# Patient Record
Sex: FEMALE | Race: WHITE | NOT HISPANIC OR LATINO | Employment: FULL TIME | URBAN - METROPOLITAN AREA
[De-identification: names, ages, dates, MRNs, and addresses within clinical notes are randomized per-mention and may not be internally consistent; named-entity substitution may affect disease eponyms.]

---

## 2020-08-04 ENCOUNTER — VIRTUAL VISIT (OUTPATIENT)
Dept: FAMILY MEDICINE | Facility: OTHER | Age: 30
End: 2020-08-04
Payer: COMMERCIAL

## 2020-08-05 ENCOUNTER — AMBULATORY - HEALTHEAST (OUTPATIENT)
Dept: FAMILY MEDICINE | Facility: CLINIC | Age: 30
End: 2020-08-05

## 2020-08-05 DIAGNOSIS — Z20.822 SUSPECTED COVID-19 VIRUS INFECTION: ICD-10-CM

## 2020-08-06 NOTE — PROGRESS NOTES
"Date: 2020 13:39:24  Clinician: Saw Huber  Clinician NPI: [id number]  Patient: Anika Olvera  Patient : 1990  Patient Address: [address] [place] [id number]  Patient Phone: [phone]  Visit Protocol: URI  Patient Summary:  Anika is a 29 year old ( : 1990 ) female who initiated a Visit for COVID-19 (Coronavirus) evaluation and screening. When asked the question \"Please sign me up to receive news, health information and promotions from [facility].\", Anika responded \"No\".    Anika states her symptoms started gradually 10-13 days ago. After her symptoms started, they improved and then got worse again.   Her symptoms consist of nausea, myalgia, and malaise.   Anika denies having wheezing, teeth pain, ageusia, diarrhea, sore throat, anosmia, facial pain or pressure, fever, cough, nasal congestion, vomiting, rhinitis, ear pain, headache, chills, and enlarged lymph nodes. She also denies having recent facial or sinus surgery in the past 60 days and taking antibiotic medication in the past month.   Precipitating events  She has not recently been exposed to someone with influenza. Anika has not been in close contact with any high risk individuals.   Pertinent COVID-19 (Coronavirus) information  In the past 14 days, Anika has not worked in a congregate living setting.   She does not work or volunteer as healthcare worker or a  and does not work or volunteer in a healthcare facility.   Anika also has not lived in a congregate living setting in the past 14 days. She does not live with a healthcare worker.   Anika has not had a close contact with a laboratory-confirmed COVID-19 patient within 14 days of symptom onset.   Triage Point(s) temporarily suspended for COVID-19 (Coronavirus) screening  Anika reported the following symptoms which were previously protocol referral points. These protocol referral points have temporarily been removed for purposes of COVID-19 " (Coronavirus) screening.   Difficulty breathing even when resting and can only speak in phrase(s)   Pertinent medical history  [place] does not get yeast infections when she takes antibiotics.   Anika does not need a return to work/school note.   Weight: 116 lbs   Anika does not smoke or use smokeless tobacco.   She denies pregnancy and denies breastfeeding. She has menstruated in the past month.   Additional information as reported by the patient (free text): Fatigue, muscle soreness, dizziness/feeling lightheaded have accompanied the shortness of breath   Weight: 116 lbs    MEDICATIONS: No current medications, ALLERGIES: NKDA  Clinician Response:  Dear Anika,   Your symptoms show that you may have coronavirus (COVID-19). This illness can cause fever, cough and trouble breathing. Many people get a mild case and get better on their own. Some people can get very sick.  What should I do?  We would like to test you for this virus.   1. Please call [phone] to schedule your visit. Explain that you were referred by [facility] to have a COVID-19 test. Be ready to share your [facility] visit ID number.  The following will serve as your written order for this COVID Test, ordered by me, for the indication of suspected COVID [Z20.828]: The test will be ordered in [employer], our electronic health record, after you are scheduled. It will show as ordered and authorized by Jin Olvera MD.  Order: COVID-19 (Coronavirus) PCR for SYMPTOMATIC testing from [facility].      2. When it's time for your COVID test:  Stay at least 6 feet away from others. (If someone will drive you to your test, stay in the backseat, as far away from the  as you can.)   Cover your mouth and nose with a mask, tissue or washcloth.  Go straight to the testing site. Don't make any stops on the way there or back.      3.Starting now: Stay home and away from others (self-isolate) until:   You've had no fever---and no medicine that reduces fever---for 3 full days (52  "hours). And...   Your other symptoms have gotten better. For example, your cough or breathing has improved. And...   At least 10 days have passed since your symptoms started.       During this time, don't leave the house except for testing or medical care.   Stay in your own room, even for meals. Use your own bathroom if you can.   Stay away from others in your home. No hugging, kissing or shaking hands. No visitors.  Don't go to work, school or anywhere else.    Clean \"high touch\" surfaces often (doorknobs, counters, handles, etc.). Use a household cleaning spray or wipes. You'll find a full list of  on the EPA website: www.epa.gov/pesticide-registration/list-n-disinfectants-use-against-sars-cov-2.   Cover your mouth and nose with a mask, tissue or washcloth to avoid spreading germs.  Wash your hands and face often. Use soap and water.  Caregivers in these groups are at risk for severe illness due to COVID-19:  o People 65 years and older  o People who live in a nursing home or long-term care facility  o People with chronic disease (lung, heart, cancer, diabetes, kidney, liver, immunologic)  o People who have a weakened immune system, including those who:   Are in cancer treatment  Take medicine that weakens the immune system, such as corticosteroids  Had a bone marrow or organ transplant  Have an immune deficiency  Have poorly controlled HIV or AIDS  Are obese (body mass index of 40 or higher)  Smoke regularly   o Caregivers should wear gloves while washing dishes, handling laundry and cleaning bedrooms and bathrooms.  o Use caution when washing and drying laundry: Don't shake dirty laundry, and use the warmest water setting that you can.  o For more tips, go to www.cdc.gov/coronavirus/2019-ncov/downloads/10Things.pdf.    4.Sign up for [facility]. We know it's scary to hear that you might have COVID-19. We want to track your symptoms to make sure you're okay over the next 2 weeks. Please look for an " email from Judith Carty---this is a free, online program that we'll use to keep in touch. To sign up, follow the link in the email. Learn more at [email or web address]  How can I take care of myself?   Get lots of rest. Drink extra fluids (unless a doctor has told you not to).   Take Tylenol (acetaminophen) for fever or pain. If you have liver or kidney problems, ask your family doctor if it's okay to take Tylenol.   Adults can take either:    650 mg (two 325 mg pills) every 4 to 6 hours, or...   1,000 mg (two 500 mg pills) every 8 hours as needed.    Note: Don't take more than 3,000 mg in one day. Acetaminophen is found in many medicines (both prescribed and over-the-counter medicines). Read all labels to be sure you don't take too much.   For children, check the Tylenol bottle for the right dose. The dose is based on the child's age or weight.    If you have other health problems (like cancer, heart failure, an organ transplant or severe kidney disease): Call your specialty clinic if you don't feel better in the next 2 days.       Know when to call 911. Emergency warning signs include:    Trouble breathing or shortness of breath Pain or pressure in the chest that doesn't go away Feeling confused like you haven't felt before, or not being able to wake up Bluish-colored lips or face.  Where can I get more information?   [facility] -- About COVID-19: [email or web address]   CDC -- What to Do If You're Sick: www.cdc.gov/coronavirus/2019-ncov/about/steps-when-sick.html   CDC -- Ending Home Isolation: www.cdc.gov/coronavirus/2019-ncov/hcp/disposition-in-home-patients.html   CDC -- Caring for Someone: www.cdc.gov/coronavirus/2019-ncov/if-you-are-sick/care-for-someone.html   [facility] -- Interim Guidance for Hospital Discharge to Home: www.health.[facility].[place].us/diseases/coronavirus/hcp/hospdischarge.pdf   [facility] clinical trials (COVID-19 research studies):  clinicalaffairs.[facility]-clinical-trials    Below are the COVID-19 hotlines at the [place] Department of Health ([facility]). Interpreters are available.    For health questions: Call 406-131-8381 or 1-355.207.5204 (7 a.m. to 7 p.m.) For questions about schools and childcare: Call 331-483-4062 or [phone] (7 a.m. to 7 p.m.)    Diagnosis: Other malaise  Diagnosis ICD: R53.81

## 2020-08-07 ENCOUNTER — COMMUNICATION - HEALTHEAST (OUTPATIENT)
Dept: SCHEDULING | Facility: CLINIC | Age: 30
End: 2020-08-07

## 2020-08-09 ENCOUNTER — NURSE TRIAGE (OUTPATIENT)
Dept: NURSING | Facility: CLINIC | Age: 30
End: 2020-08-09

## 2020-08-09 NOTE — TELEPHONE ENCOUNTER
"Chest tightness and SOB for one week she did have covid test on Wed and she got results on Thursday.      Reason for Disposition    MODERATE difficulty breathing (e.g., speaks in phrases, SOB even at rest, pulse 100-120)    Additional Information    Negative: SEVERE difficulty breathing (e.g., struggling for each breath, speaks in single words)    Negative: Difficult to awaken or acting confused (e.g., disoriented, slurred speech)    Negative: Bluish (or gray) lips or face now    Negative: Shock suspected (e.g., cold/pale/clammy skin, too weak to stand, low BP, rapid pulse)    Negative: Sounds like a life-threatening emergency to the triager    Negative: [1] COVID-19 exposure AND [2] no symptoms    Negative: COVID-19 and Breastfeeding, questions about    Negative: [1] Adult with possible COVID-19 symptoms AND [2] triager concerned about severity of symptoms or other causes    Negative: SEVERE or constant chest pain or pressure (Exception: mild central chest pain, present only when coughing)    Answer Assessment - Initial Assessment Questions  1. COVID-19 DIAGNOSIS: \"Who made your Coronavirus (COVID-19) diagnosis?\" \"Was it confirmed by a positive lab test?\" If not diagnosed by a HCP, ask \"Are there lots of cases (community spread) where you live?\" (See public health department website, if unsure)      She has not been exposed to anyone she knows of  2. ONSET: \"When did the COVID-19 symptoms start?\"       7 days  3. WORST SYMPTOM: \"What is your worst symptom?\" (e.g., cough, fever, shortness of breath, muscle aches)      Chest tightness and SOB  4. COUGH: \"Do you have a cough?\" If so, ask: \"How bad is the cough?\"        no  5. FEVER: \"Do you have a fever?\" If so, ask: \"What is your temperature, how was it measured, and when did it start?\"      no  6. RESPIRATORY STATUS: \"Describe your breathing?\" (e.g., shortness of breath, wheezing, unable to speak)       She can lay flat but is worse with exertion and she has pain on " "her sternum and in her upper back  7. BETTER-SAME-WORSE: \"Are you getting better, staying the same or getting worse compared to yesterday?\"  If getting worse, ask, \"In what way?\"      Getting worse  8. HIGH RISK DISEASE: \"Do you have any chronic medical problems?\" (e.g., asthma, heart or lung disease, weak immune system, etc.)     no  9. PREGNANCY: \"Is there any chance you are pregnant?\" \"When was your last menstrual period?\"      no  10. OTHER SYMPTOMS: \"Do you have any other symptoms?\"  (e.g., chills, fatigue, headache, loss of smell or taste, muscle pain, sore throat)        no    Protocols used: CORONAVIRUS (COVID-19) DIAGNOSED OR [id number] 5.16.20      "

## 2021-06-20 ENCOUNTER — HEALTH MAINTENANCE LETTER (OUTPATIENT)
Age: 31
End: 2021-06-20

## 2021-09-20 ENCOUNTER — NURSE TRIAGE (OUTPATIENT)
Dept: NURSING | Facility: CLINIC | Age: 31
End: 2021-09-20

## 2021-09-20 ENCOUNTER — OFFICE VISIT (OUTPATIENT)
Dept: URGENT CARE | Facility: URGENT CARE | Age: 31
End: 2021-09-20
Payer: COMMERCIAL

## 2021-09-20 VITALS
HEART RATE: 75 BPM | OXYGEN SATURATION: 100 % | DIASTOLIC BLOOD PRESSURE: 70 MMHG | RESPIRATION RATE: 16 BRPM | SYSTOLIC BLOOD PRESSURE: 105 MMHG | TEMPERATURE: 98.2 F

## 2021-09-20 DIAGNOSIS — B02.9 HERPES ZOSTER WITHOUT COMPLICATION: Primary | ICD-10-CM

## 2021-09-20 PROCEDURE — 99203 OFFICE O/P NEW LOW 30 MIN: CPT | Performed by: FAMILY MEDICINE

## 2021-09-20 RX ORDER — VALACYCLOVIR HYDROCHLORIDE 1 G/1
1000 TABLET, FILM COATED ORAL 3 TIMES DAILY
Qty: 21 TABLET | Refills: 0 | Status: SHIPPED | OUTPATIENT
Start: 2021-09-20 | End: 2021-12-02

## 2021-09-20 NOTE — PROGRESS NOTES
SUBJECTIVE: Anika Olvera is a 31 year old female presenting with a chief complaint of left leg rash and tingling.  Onset of symptoms was day(s) ago.    No past medical history on file.  No Known Allergies  Social History     Tobacco Use     Smoking status: Never Smoker     Smokeless tobacco: Never Used   Substance Use Topics     Alcohol use: Not on file       ROS:  SKIN: no rash  GI: no vomiting    OBJECTIVE:  /70   Pulse 75   Temp 98.2  F (36.8  C) (Tympanic)   Resp 16   SpO2 100%   Breastfeeding No GENERAL APPEARANCE: healthy, alert and no distress  SKIN: red vesicular rash left lower ext      ICD-10-CM    1. Herpes zoster without complication  B02.9 valACYclovir (VALTREX) 1000 mg tablet       Fluids/Rest, f/u if worse/not any better

## 2021-09-20 NOTE — TELEPHONE ENCOUNTER
Triage call. Patient calling.     She has a rash that started a couple of days ago.  Had itching in left upper thigh which progressed to pain. Now has 2 small blisters on her upper thigh.  Pain is better today.  Has had chicken pox, concerned that it could be shingles.Also noticed that the pain was traveling down her left leg.     Per protocol, see today in office. Advised to be seen at urgent care if no appointments available.  Patient agrees to plan. Transferred to scheduling.     Velia Alcala RN 09/20/21 2:43 PM  [facility] Nurse Advisor    Reason for Disposition    Painful rash with multiple small blisters grouped together (i.e., dermatomal distribution or 'band' or 'stripe')    Additional Information    Negative: Sounds like a life-threatening emergency to the triager    Negative: Fever and localized purple or blood-colored spots or dots that are not from injury or friction    Negative: Fever and localized rash is very painful    Negative: Patient sounds very sick or weak to the triager    Negative: Looks like a boil, infected sore, deep ulcer, or other infected rash (spreading redness, pus)    Protocols used: RASH OR REDNESS - [id number]    COVID 19 Nurse Triage Plan/Patient Instructions    Please be aware that novel coronavirus (COVID-19) may be circulating in the community. If you develop symptoms such as fever, cough, or SOB or if you have concerns about the presence of another infection including coronavirus (COVID-19), please contact your health care provider or visit [email or web address].     Disposition/Instructions    In-Person Visit with provider recommended. Reference Visit Selection Guide.    Thank you for taking steps to prevent the spread of this virus.  o Limit your contact with others.  o Wear a simple mask to cover your cough.  o Wash your hands well and often.    Resources    [facility]: About COVID-19: [email or web address]    CDC: What to Do If You're  Sick: www.cdc.gov/coronavirus/2019-ncov/about/steps-when-sick.html    CDC: Ending Home Isolation: www.cdc.gov/coronavirus/2019-ncov/hcp/disposition-in-home-patients.html     CDC: Caring for Someone: www.cdc.gov/coronavirus/2019-ncov/if-you-are-sick/care-for-someone.html     [facility]: Interim Guidance for Hospital Discharge to Home: www.[facility].mn./diseases/coronavirus/hcp/hospdischarge.pdf    [facility] clinical trials (COVID-19 research studies): clinicalaffairs.[facility]-clinical-trials     Below are the COVID-19 hotlines at the [place] Department of Health ([facility]). Interpreters are available.   o For health questions: Call 232-253-4806 or 1-423.495.8283 (7 a.m. to 7 p.m.)  o For questions about schools and childcare: Call 857-851-7499 or 1-620.808.8879 (7 a.m. to 7 p.m.)

## 2021-10-11 ENCOUNTER — HEALTH MAINTENANCE LETTER (OUTPATIENT)
Age: 31
End: 2021-10-11

## 2021-11-01 ASSESSMENT — PATIENT HEALTH QUESTIONNAIRE - PHQ9
SUM OF ALL RESPONSES TO PHQ QUESTIONS 1-9: 12
SUM OF ALL RESPONSES TO PHQ QUESTIONS 1-9: 12
10. IF YOU CHECKED OFF ANY PROBLEMS, HOW DIFFICULT HAVE THESE PROBLEMS MADE IT FOR YOU TO DO YOUR WORK, TAKE CARE OF THINGS AT HOME, OR GET ALONG WITH OTHER PEOPLE: VERY DIFFICULT

## 2021-11-02 ASSESSMENT — PATIENT HEALTH QUESTIONNAIRE - PHQ9: SUM OF ALL RESPONSES TO PHQ QUESTIONS 1-9: 12

## 2021-11-04 ENCOUNTER — OFFICE VISIT (OUTPATIENT)
Dept: OBGYN | Facility: CLINIC | Age: 31
End: 2021-11-04
Payer: COMMERCIAL

## 2021-11-04 VITALS
OXYGEN SATURATION: 99 % | SYSTOLIC BLOOD PRESSURE: 137 MMHG | TEMPERATURE: 98.5 F | DIASTOLIC BLOOD PRESSURE: 80 MMHG | HEART RATE: 95 BPM

## 2021-11-04 DIAGNOSIS — Z12.4 ENCOUNTER FOR PAPANICOLAOU SMEAR FOR CERVICAL CANCER SCREENING: Primary | ICD-10-CM

## 2021-11-04 DIAGNOSIS — Z30.431 IUD CHECK UP: ICD-10-CM

## 2021-11-04 DIAGNOSIS — F41.9 ANXIETY: ICD-10-CM

## 2021-11-04 PROCEDURE — G0124 SCREEN C/V THIN LAYER BY MD: HCPCS | Performed by: PATHOLOGY

## 2021-11-04 PROCEDURE — G0145 SCR C/V CYTO,THINLAYER,RESCR: HCPCS | Performed by: OBSTETRICS & GYNECOLOGY

## 2021-11-04 PROCEDURE — 99203 OFFICE O/P NEW LOW 30 MIN: CPT | Performed by: OBSTETRICS & GYNECOLOGY

## 2021-11-04 PROCEDURE — 87624 HPV HI-RISK TYP POOLED RSLT: CPT | Performed by: OBSTETRICS & GYNECOLOGY

## 2021-11-04 NOTE — PATIENT INSTRUCTIONS
Prior to IUD removal and replacement, make sure you are well hydrated, eat a meal and take ibuprofen 600-800mg (3-4 tabs) approx 30 min before appointment.

## 2021-11-04 NOTE — PROGRESS NOTES
GYN CLINIC VISIT  11/4/2021  CC: IUD discussion, pap    HPI:  Anika Olvera is a 31 year old with mirena IUD for contraception and heavy menses.   Interested in replacement but not today.      PHQ 11/1/2021   PHQ-9 Total Score 12   Q9: Thoughts of better off dead/self-harm past 2 weeks Not at all       Answers for HPI/ROS submitted by the patient on 11/1/2021  If you checked off any problems, how difficult have these problems made it for you to do your work, take care of things at home, or get along with other people?: Very difficult  PHQ9 TOTAL SCORE: 12      Vitals:    11/04/21 1036   BP: 137/80   Pulse: 95   Temp: 98.5  F (36.9  C)   SpO2: 99%       Gen: alert, oriented, no distress,  pleasant, appears stated age, well groomed  Abd: soft,, nontender, nondistended  : normal external genitalia without lesions or erythema; normal, well supported urethra, normal Bartholins, normal Skenes; normal pink rugated vaginal mucosa, no lesions or abnormal discharge; medium sherrie speculum inserted without difficulty; normal appearing cervix without lesions, bleeding or discharge. Bimanual exam shows 6week sized uterus, mobile, no fundal tenderness, no CMT, no adnexal masses or tenderness.  Extr: warm, well perfused, nontender, no edema  Psych: affect bright, cooperative, responds appropriately    ASSESSMENT  31 year old here for pap and to discuss IUD replacement    1. Encounter for Papanicolaou smear for cervical cancer screening  Follow up per ASCCP guidelines.  - Pap imaged thin layer screen with HPV - recommended age 30 - 65 years (select HPV order below)    2. IUD check up  Scheduled for IUD replacement in Dec.  Recommend she eat and take ibuprofen 600-800 prior to visit.    3. Anxiety  - MENTAL HEALTH REFERRAL  - Adult; Outpatient Treatment; Individual/Couples/Family/Group Therapy/Health Psychology; [facility] [phone]; We will contact you to schedule the appointment or please call with any  questions; Future      Vero Mejia MD

## 2021-11-11 LAB
BKR LAB AP GYN ADEQUACY: ABNORMAL
BKR LAB AP GYN INTERPRETATION: ABNORMAL
BKR LAB AP HPV REFLEX: ABNORMAL
BKR LAB AP PREVIOUS ABNORMAL: ABNORMAL
PATH REPORT.COMMENTS IMP SPEC: ABNORMAL
PATH REPORT.COMMENTS IMP SPEC: ABNORMAL
PATH REPORT.RELEVANT HX SPEC: ABNORMAL

## 2021-11-12 LAB
HUMAN PAPILLOMA VIRUS 16 DNA: NEGATIVE
HUMAN PAPILLOMA VIRUS 18 DNA: NEGATIVE
HUMAN PAPILLOMA VIRUS FINAL DIAGNOSIS: NORMAL
HUMAN PAPILLOMA VIRUS OTHER HR: NEGATIVE

## 2021-11-15 PROBLEM — R87.610 ASCUS OF CERVIX WITH NEGATIVE HIGH RISK HPV: Status: ACTIVE | Noted: 2021-11-15

## 2021-11-26 ENCOUNTER — TELEPHONE (OUTPATIENT)
Dept: BEHAVIORAL HEALTH | Facility: CLINIC | Age: 31
End: 2021-11-26
Payer: COMMERCIAL

## 2021-11-30 ENCOUNTER — VIRTUAL VISIT (OUTPATIENT)
Dept: PSYCHOLOGY | Facility: CLINIC | Age: 31
End: 2021-11-30
Payer: COMMERCIAL

## 2021-11-30 DIAGNOSIS — F41.1 GENERALIZED ANXIETY DISORDER: Primary | ICD-10-CM

## 2021-11-30 PROCEDURE — 90834 PSYTX W PT 45 MINUTES: CPT | Mod: GT | Performed by: STUDENT IN AN ORGANIZED HEALTH CARE EDUCATION/TRAINING PROGRAM

## 2021-11-30 ASSESSMENT — ANXIETY QUESTIONNAIRES
7. FEELING AFRAID AS IF SOMETHING AWFUL MIGHT HAPPEN: NEARLY EVERY DAY
5. BEING SO RESTLESS THAT IT IS HARD TO SIT STILL: MORE THAN HALF THE DAYS
2. NOT BEING ABLE TO STOP OR CONTROL WORRYING: MORE THAN HALF THE DAYS
1. FEELING NERVOUS, ANXIOUS, OR ON EDGE: NEARLY EVERY DAY
5. BEING SO RESTLESS THAT IT IS HARD TO SIT STILL: NEARLY EVERY DAY
8. IF YOU CHECKED OFF ANY PROBLEMS, HOW DIFFICULT HAVE THESE MADE IT FOR YOU TO DO YOUR WORK, TAKE CARE OF THINGS AT HOME, OR GET ALONG WITH OTHER PEOPLE?: VERY DIFFICULT
1. FEELING NERVOUS, ANXIOUS, OR ON EDGE: NEARLY EVERY DAY
3. WORRYING TOO MUCH ABOUT DIFFERENT THINGS: MORE THAN HALF THE DAYS
7. FEELING AFRAID AS IF SOMETHING AWFUL MIGHT HAPPEN: NEARLY EVERY DAY
6. BECOMING EASILY ANNOYED OR IRRITABLE: NEARLY EVERY DAY
7. FEELING AFRAID AS IF SOMETHING AWFUL MIGHT HAPPEN: NEARLY EVERY DAY
4. TROUBLE RELAXING: NEARLY EVERY DAY
4. TROUBLE RELAXING: MORE THAN HALF THE DAYS
GAD7 TOTAL SCORE: 16
2. NOT BEING ABLE TO STOP OR CONTROL WORRYING: MORE THAN HALF THE DAYS
GAD7 TOTAL SCORE: 16
GAD7 TOTAL SCORE: 18
IF YOU CHECKED OFF ANY PROBLEMS ON THIS QUESTIONNAIRE, HOW DIFFICULT HAVE THESE PROBLEMS MADE IT FOR YOU TO DO YOUR WORK, TAKE CARE OF THINGS AT HOME, OR GET ALONG WITH OTHER PEOPLE: VERY DIFFICULT
GAD7 TOTAL SCORE: 16
6. BECOMING EASILY ANNOYED OR IRRITABLE: SEVERAL DAYS
3. WORRYING TOO MUCH ABOUT DIFFERENT THINGS: MORE THAN HALF THE DAYS

## 2021-11-30 ASSESSMENT — PATIENT HEALTH QUESTIONNAIRE - PHQ9
SUM OF ALL RESPONSES TO PHQ QUESTIONS 1-9: 9
SUM OF ALL RESPONSES TO PHQ QUESTIONS 1-9: 9
10. IF YOU CHECKED OFF ANY PROBLEMS, HOW DIFFICULT HAVE THESE PROBLEMS MADE IT FOR YOU TO DO YOUR WORK, TAKE CARE OF THINGS AT HOME, OR GET ALONG WITH OTHER PEOPLE: SOMEWHAT DIFFICULT

## 2021-11-30 ASSESSMENT — COLUMBIA-SUICIDE SEVERITY RATING SCALE - C-SSRS
4. HAVE YOU HAD THESE THOUGHTS AND HAD SOME INTENTION OF ACTING ON THEM?: NO
TOTAL  NUMBER OF INTERRUPTED ATTEMPTS PAST 3 MONTHS: NO
1. IN THE PAST MONTH, HAVE YOU WISHED YOU WERE DEAD OR WISHED YOU COULD GO TO SLEEP AND NOT WAKE UP?: YES
6. HAVE YOU EVER DONE ANYTHING, STARTED TO DO ANYTHING, OR PREPARED TO DO ANYTHING TO END YOUR LIFE?: NO
3. HAVE YOU BEEN THINKING ABOUT HOW YOU MIGHT KILL YOURSELF?: NO
2. HAVE YOU ACTUALLY HAD ANY THOUGHTS OF KILLING YOURSELF?: YES
2. HAVE YOU ACTUALLY HAD ANY THOUGHTS OF KILLING YOURSELF LIFETIME?: YES
4. HAVE YOU HAD THESE THOUGHTS AND HAD SOME INTENTION OF ACTING ON THEM?: NO
1. IN THE PAST MONTH, HAVE YOU WISHED YOU WERE DEAD OR WISHED YOU COULD GO TO SLEEP AND NOT WAKE UP?: NO
5. HAVE YOU STARTED TO WORK OUT OR WORKED OUT THE DETAILS OF HOW TO KILL YOURSELF? DO YOU INTEND TO CARRY OUT THIS PLAN?: NO
5. HAVE YOU STARTED TO WORK OUT OR WORKED OUT THE DETAILS OF HOW TO KILL YOURSELF? DO YOU INTEND TO CARRY OUT THIS PLAN?: NO
ATTEMPT PAST THREE MONTHS: NO
TOTAL  NUMBER OF ABORTED OR SELF INTERRUPTED ATTEMPTS PAST 3 MONTHS: NO
REASONS FOR IDEATION LIFETIME: COMPLETELY TO GET ATTENTION, REVENGE OR A REACTION FROM OTHERS
ATTEMPT LIFETIME: NO

## 2021-11-30 NOTE — PROGRESS NOTES
[facility]   Mental Health & Addiction Services     Progress Note - Initial Visit    Patient  Name:  Anika Olvera Date: 2021         Service Type: Individual     Visit Start Time: 9 am   Visit End Time: 9:45    Session Length:  45 mns    Visit #: 1    Attendees: Client attended alone    Service Modality:  Video Visit:      Provider verified identity through the following two step process.  Patient provided:  Patient photo, Patient  and Patient address    Telemedicine Visit: The patient's condition can be safely assessed and treated via synchronous audio and visual telemedicine encounter.      Reason for Telemedicine Visit: Patient has requested telehealth visit    Originating Site (Patient Location): Patient's home    Distant Site (Provider Location): Provider Remote Setting- Home Office    Consent:  The patient/guardian has verbally consented to: the potential risks and benefits of telemedicine (video visit) versus in person care; bill my insurance or make self-payment for services provided; and responsibility for payment of non-covered services.     Patient would like the video invitation sent by:  My Chart    Mode of Communication:  Video Conference via Amwell    As the provider I attest to compliance with applicable laws and regulations related to telemedicine.       DATA:   Interactive Complexity: No   Crisis: No     Presenting Concerns/  Current Stressors:   Patient reported feeling anxious, lonely and depresed      ASSESSMENT:  Mental Status Assessment:  Appearance:   Appropriate   Eye Contact:   Good   Psychomotor Behavior: Normal   Attitude:   Cooperative   Orientation:   All  Speech   Rate / Production: Normal/ Responsive   Volume:  Normal   Mood:    Anxious   Affect:    Appropriate   Thought Content:  Clear   Thought Form:  Coherent  Goal Directed   Insight:    Good       Safety Issues and Plan for Safety and Risk Management:     [place] Suicide Severity Rating Scale  "(Lifetime/Recent)  [place] Suicide Severity Rating (Lifetime/Recent) 11/30/2021   1. Wish to be Dead (Lifetime) Yes   Wish to be Dead Description (Lifetime) Patient reported that \" I had a stressfull family growing up and my parents  when I was 16 and I did not feel supported but I had just some then with no plan or intention to do it\"   1. Wish to be Dead (Recent) No   2. Non-Specific Active Suicidal Thoughts (Lifetime) Yes   2. Non-Specific Active Suicidal Thoughts (Recent) Yes   Non-Specific Active Suicidal Thought Description (Recent) (No Data)   Comments NA   3. Active Suicidal Ideation with any Methods (Not Plan) Without Intent to Act (Lifetime) No   3. Active Suicidal Ideation with any Methods (Not Plan) Without Intent to Act (Recent) No   Active Suicidal Ideation with any Methods (Not Plan) Description (Recent) (No Data)   Comments NA   4. Active Suicidal Ideation with Some Intent to Act, Without Specific Plan (Lifetime) No   4. Active Suicidal Ideation with Some Intent to Act, Without Specific Plan (Recent) No   Active Suicidal Ideation with Some Intent to Act, Without Specific Plan Description (Recent) No   5. Active Suicidal Ideation with Specific Plan and Intent (Lifetime) No   5. Active Suicidal Ideation with Specific Plan and Intent (Recent) No   Active Suicidal Ideation with Specific Plan and Intent Description (Recent) NA   Most Severe Ideation Rating (Lifetime) NA   Frequency (Lifetime) NA   Duration (Lifetime) 1   Controllability (Lifetime) 1   Protective Factors  (Lifetime) 0   Reasons for Ideation (Lifetime) 1   Most Severe Ideation Rating (Past Month) NA   Most Severe Ideation Description (Past Month) NA   Frequency (Past Month) NA   Duration (Past Month) NA   Controllability (Past Month) NA   Protective Factors (Past Month) NA   Reasons for Ideation (Past Month) NA   Actual Attempt (Lifetime) No   Actual Attempt (Past 3 Months) No   Has subject engaged in non-suicidal self-injurious " behavior? (Lifetime) No   Has subject engaged in non-suicidal self-injurious behavior? (Past 3 Months) No   Interrupted Attempts (Past 3 Months) No   Aborted or Self-Interrupted Attempt (Past 3 Months) No   Preparatory Acts or Behavior (Past 3 Months) No   Most Recent Attempt Actual Lethality Code NA   Most Lethal Attempt Actual Lethality Code NA   Initial/First Attempt Actual Lethality Code NA     Patient denies current fears or concerns for personal safety.  Patient denies current or recent suicidal ideation or behaviors.  Patient denies current or recent homicidal ideation or behaviors.  Patient denies current or recent self injurious behavior or ideation.  Patient denies other safety concerns.  Recommended that patient call 911 or go to the local ED should there be a change in any of these risk factors.  Patient reports there are no firearms in the house.     Diagnostic Criteria:    PHQ 11/1/2021 11/30/2021   PHQ-9 Total Score 12 9   Q9: Thoughts of better off dead/self-harm past 2 weeks Not at all Not at all     JANUSZ-7 SCORE 11/30/2021   Total Score 16 (severe anxiety)   Total Score 16       Generalized Anxiety Disorder  A. Excessive anxiety and worry about a number of events or activities (such as work or school performance).   B. The person finds it difficult to control the worry.   - Restlessness or feeling keyed up or on edge.    - Being easily fatigued.    - Irritability.    - Sleep disturbance (difficulty falling or staying asleep, or restless unsatisfying sleep).   D. The focus of the anxiety and worry is not confined to features of an Axis I disorder.  E. The anxiety, worry, or physical symptoms cause clinically significant distress or impairment in social, occupational, or other important areas of functioning.   F. The disturbance is not due to the direct physiological effects of a substance (e.g., a drug of abuse, a medication) or a general medical condition (e.g., hyperthyroidism) and does not occur  exclusively during a Mood Disorder, a Psychotic Disorder, or a Pervasive Developmental Disorder.    - The aformentioned symptoms began 1 year(s) ago and occurs 7 days per week and is experienced as moderate.      DSM5 Diagnoses: (Sustained by DSM5 Criteria Listed Above)  Diagnoses: 300.02 (F41.1) Generalized Anxiety Disorder  Psychosocial & Contextual Factors: Patient 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 Patient reported relocating to [place] for a contract job, reported that she is lonely, stress without community and a support system. Limited finances and the uncertainties surrounding future financial stability and relationship are anxiety inducing.  WHODAS 2.0 (12 item):   WHODAS 2.0 Total Score 11/24/2021   Total Score 30   Total Score MyChart 30     Intervention:   Mindfulness- Patient was educated on relaxation and mindfulness techniques  Collateral Reports Completed:  Not Applicable      PLAN: (Homework, other):  1. Provider will continue Diagnostic Assessment.  Patient was given the following to do until next session:  Practice mindful meditation and deep breathing exercises when feeling anxious.    2. Provider recommended the following referrals: None at this time.      3.  Suicide Risk and Safety Concerns were assessed for Anika Olvera.    Patient meets the following risk assessment and triage: Risk assessment not completed as patient had minimal suicidal thoughts with no plan to act 20 years ago.      RAGHU Che  November 30, 2021    ----- Service Performed and Documented by BLESSING------  This note was reviewed by TANNA Sutton [facility]       Answers for HPI/ROS submitted by the patient on 11/30/2021  If you checked off any problems, how difficult have these problems made it for you to do your work, take care of things at home, or get along with other people?: Somewhat difficult  PHQ9 TOTAL SCORE: 9  JANUSZ 7 TOTAL SCORE: 16

## 2021-12-01 ASSESSMENT — ANXIETY QUESTIONNAIRES: GAD7 TOTAL SCORE: 16

## 2021-12-01 ASSESSMENT — PATIENT HEALTH QUESTIONNAIRE - PHQ9: SUM OF ALL RESPONSES TO PHQ QUESTIONS 1-9: 9

## 2021-12-02 ENCOUNTER — OFFICE VISIT (OUTPATIENT)
Dept: OBGYN | Facility: CLINIC | Age: 31
End: 2021-12-02
Payer: COMMERCIAL

## 2021-12-02 VITALS
DIASTOLIC BLOOD PRESSURE: 77 MMHG | WEIGHT: 128 LBS | BODY MASS INDEX: 23.55 KG/M2 | HEIGHT: 62 IN | OXYGEN SATURATION: 98 % | SYSTOLIC BLOOD PRESSURE: 117 MMHG | HEART RATE: 98 BPM

## 2021-12-02 DIAGNOSIS — Z30.433 ENCOUNTER FOR REMOVAL AND REINSERTION OF INTRAUTERINE CONTRACEPTIVE DEVICE: Primary | ICD-10-CM

## 2021-12-02 PROCEDURE — 58300 INSERT INTRAUTERINE DEVICE: CPT | Performed by: OBSTETRICS & GYNECOLOGY

## 2021-12-02 PROCEDURE — 58301 REMOVE INTRAUTERINE DEVICE: CPT | Performed by: OBSTETRICS & GYNECOLOGY

## 2021-12-02 ASSESSMENT — MIFFLIN-ST. JEOR: SCORE: 1238.85

## 2021-12-02 NOTE — PATIENT INSTRUCTIONS

## 2021-12-02 NOTE — PROGRESS NOTES
GYN CLINIC VISIT  2021  CC: IUD removal and replacement    SUBJECTIVE:    Is a pregnancy test required: No.  Was a consent obtained?  Yes    Subjective: Anika Olvera is a 31 year old  presents for IUD and desires Mirena type IUD.  She requests removal of the IUD because the IUD effectiveness has     Patient has been given the opportunity to ask questions about all forms of birth control, including all options appropriate for Anika Olvera. Discussed that no method of birth control, except abstinence is 100% effective against pregnancy or sexually transmitted infection.     Anika Olvera understands she may have the IUD removed at any time. IUD should be removed by a health care provider and the current IUD will be removed today.    The entire removal and insertion procedure was reviewed with the patient, including care after placement.    Today's PHQ-2 Score:   PHQ-2 (  [employer]) 2021   Q1: Little interest or pleasure in doing things 3   Q2: Feeling down, depressed or hopeless 3   PHQ-2 Score 6   PHQ-2 Total Score (12-17 Years)- Positive if 3 or more points; Administer PHQ-A if positive 6   Q1: Little interest or pleasure in doing things Nearly every day   Q2: Feeling down, depressed or hopeless Nearly every day   PHQ-2 Score 6       PROCEDURE:    took tylenol prior to procedure  A speculum exam was performed and the cervix was visualized. The IUD string was visualized. Using ring forceps, the string  was grasped and the IUD removed intact.    Under sterile technique, cervix was visualized with speculum and prepped with Betadine solution swab x 3. Tenaculum was placed for stability. The uterus was gently straightened and sounded to 8.0 cm. IUD prepared for placement, and IUD inserted according to 's instructions without difficulty or significant ressitance, and deployed at the fundus. The strings were visualized and trimmed to 3.0 cm from the external os. Tenaculum was  removed and hemostasis noted. Speculum removed.  Patient tolerated procedure well.    Lot # [id number]  Exp: Dec 2023    EBL: minimal    Complications: none        POST PROCEDURE:  Given 's handouts, including when to have IUD removed, list of danger s/sx, side effects and follow up recommended. Encouraged condom use for prevention of STD. Advised to call for any fever, for prolonged or severe pain or bleeding, abnormal vaginal dischage, or unable to palpate strings. She was advised to use pain medications (ibuprofen) as needed for mild to moderate pain. Advised to follow-up in clinic in 4-6 weeks for IUD string check if unable to find strings or as directed by provider.       1. Encounter for removal and reinsertion of intrauterine contraceptive device  - levonorgestrel (MIRENA) 20 MCG/24HR IUD 20 mcg  - levonorgestrel (MIRENA) 20 MCG/24HR IUD; 1 each (20 mcg) by Intrauterine route once  - INSERTION INTRAUTERINE DEVICE  - REMOVE INTRAUTERINE DEVICE      Vero Mejia MD

## 2021-12-09 ENCOUNTER — VIRTUAL VISIT (OUTPATIENT)
Dept: PSYCHOLOGY | Facility: CLINIC | Age: 31
End: 2021-12-09
Payer: COMMERCIAL

## 2021-12-09 DIAGNOSIS — F41.1 GENERALIZED ANXIETY DISORDER: Primary | ICD-10-CM

## 2021-12-09 PROCEDURE — 90791 PSYCH DIAGNOSTIC EVALUATION: CPT | Mod: GT | Performed by: STUDENT IN AN ORGANIZED HEALTH CARE EDUCATION/TRAINING PROGRAM

## 2021-12-09 NOTE — PROGRESS NOTES
"[facility]  Provider Name:  Ray Darnell     Credentials:  SW    PATIENT'S NAME: Anika Olvera  PREFERRED NAME: Anika  PRONOUNS:   she/her    MRN: [id number]  : 1990  ADDRESS: [address] [place] [address] [address] [place] [id number]  ACCT. NUMBER:  [id number]  DATE OF SERVICE: 21  START TIME: 12:30  END TIME: 1:20  PREFERRED PHONE: [phone]  May we leave a program related message: Yes  SERVICE MODALITY:  Video Visit:      Provider verified identity through the following two step process.  Patient provided:  Patient photo, Patient  and Patient address    Telemedicine Visit: The patient's condition can be safely assessed and treated via synchronous audio and visual telemedicine encounter.      Reason for Telemedicine Visit: Patient has requested telehealth visit    Originating Site (Patient Location): Patient's home    Distant Site (Provider Location): Provider Remote Setting- Home Office    Consent:  The patient/guardian has verbally consented to: the potential risks and benefits of telemedicine (video visit) versus in person care; bill my insurance or make self-payment for services provided; and responsibility for payment of non-covered services.     Patient would like the video invitation sent by:  My Chart    Mode of Communication:  Video Conference via Ribbit    As the provider I attest to compliance with applicable laws and regulations related to telemedicine.    UNIVERSAL ADULT Mental Health DIAGNOSTIC ASSESSMENT    Identifying Information:  Patient is a 31 year old, female .  The pronoun use throughout this assessment reflects the patient's chosen pronoun.  Patient was referred for an assessment by primary care providerreferring provider.  Patient attended the session alone.    Chief Complaint:   The reason for seeking services at this time is: \"Anxiety\".  The problem(s) began 06/15/20.    Patient has not attempted to resolve these concerns in the past.    Social/Family " History:  Patient reported they grew up in [place], [place].  They were raised by biological parents  .  Parents  / .  Patient reported that their childhood was isolating.  Patient described their current relationships with family of origin as weary.     The patient describes their cultural background as .  Cultural influences and impact on patient's life structure, values, norms, and healthcare: N/A.  Contextual influences on patient's health include: Community Factors related to COVID and Economic Factors finances.    These factors will be addressed in the Preliminary Treatment plan. Patient identified their preferred language to be English. Patient reported they does not need the assistance of an  or other support involved in therapy.     Patient reported had no significant delays in developmental tasks.   Patient's highest education level was graduate school  .  Patient identified the following learning problems: none reported.  Modifications will not be used to assist communication in therapy.  Patient reports they are  able to understand written materials.    Patient reported the following relationship history had a couple of fairly long term relationships that did not work out,  for 2 years and dirvorced .  Patient's current relationship status is has a partner or significant other for 4 years.   Patient identified their sexual orientation as heterosexual.  Patient reported having   child(jorge luis). Patient identified partner; pets as part of their support system.  Patient identified the quality of these relationships as fair,  .      Patient's current living/housing situation involves staying in own home/apartment.  The immediate members of family and household include Martina, 70,Mother  and they report that housing is stable.    Patient is currently employed fulltime.  Patient reports their finances are obtained through employment. Patient does identify finances as a  current stressor.      Patient reported that they have not been involved with the legal system.    . Patient does not report being under probation/ parole/ jurisdiction. They are not under any current court jurisdiction. .    Patient's Strengths and Limitations:  Patient identified the following strengths or resources that will help them succeed in treatment: commitment to health and well being, insight, motivation and strong social skills. Things that may interfere with the patient's success in treatment include: few friends and lack of social support.     Personal and Family Medical History:  Patient does report a family history of mental health concerns.  Patient reports family history includes No Known Problems in her father, maternal grandfather, maternal grandmother, mother, paternal grandfather, paternal grandmother, and sister..     Patient does report Mental Health Diagnosis and/or Treatment.  Patient Patient reported the following previous diagnoses which include(s): an Anxiety Disorder.  Patient reported symptoms began 2005.   Patient has received mental health services in the past: therapy with a psychiatrist and meds.  Psychiatric Hospitalizations: None.  Patient denies a history of civil commitment.  Patient is not receiving other mental health services.  These include none.         Patient has had a physical exam to rule out medical causes for current symptoms.  Date of last physical exam was within the past year. Client was encouraged to follow up with PCP if symptoms were to develop. The patient has a [facility] Primary Care Provider, who is named No Ref-Primary, Physician..  Patient reports no current medical concerns.  Patient reports pain concerns including back pain.  Patient does want help addressing pain concerns..   There are not significant appetite / nutritional concerns / weight changes.   Patient does not report a history of head injury / trauma / cognitive impairment.      Patient reports  current meds as:   No outpatient medications have been marked as taking for the 12/9/21 encounter (Virtual Visit) with Ray Patrick LGSW.       Medication Adherence:  Patient reports not currently prescribed.  taking prescribed medications as prescribed.    Patient Allergies:  No Known Allergies    Medical History:    Past Medical History:   Diagnosis Date     Abnormal Pap smear of cervix 11/04/2021 11/4/21         Current Mental Status Exam:   Appearance:  Appropriate    Eye Contact:  Good   Psychomotor:  Normal       Gait / station:  no problem  Attitude / Demeanor: Cooperative   Speech      Rate / Production: Normal/ Responsive      Volume:  Normal  volume      Language:  intact  Mood:   Normal  Affect:   Appropriate    Thought Content: Clear   Thought Process: Coherent  Goal Directed       Associations: No loosening of associations  Insight:   Good   Judgment:  Intact   Orientation:  All  Attention/concentration: Good    Rating Scales:    PHQ9:    PHQ-9 SCORE 11/1/2021 11/30/2021   PHQ-9 Total Score MyChart 12 (Moderate depression) 9 (Mild depression)   PHQ-9 Total Score 12 9       GAD7:    JANUSZ-7 SCORE 11/30/2021   Total Score 16 (severe anxiety)   Total Score 16     CGI:     First:No data recorded    Most recentNo data recorded    Substance Use:  Patient did not report a family history of substance use concerns; see medical history section for details.  Patient has not received chemical dependency treatment in the past.  Patient has not ever been to detox.      Patient is not currently receiving any chemical dependency treatment.           Substance History of use Age of first use Date of last use     Pattern and duration of use (include amounts and frequency)   Alcohol used in the past   18 10/15/21 REPORTS SUBSTANCE USE: reports using substance 1 times per month and has 1 beer at a time.   Patient reports heaviest use was in the past.   Cannabis   never used     REPORTS SUBSTANCE USE: N/A     Amphetamines    "never used     REPORTS SUBSTANCE USE: N/A   Cocaine/crack    never used       REPORTS SUBSTANCE USE: N/A   Hallucinogens never used         REPORTS SUBSTANCE USE: N/A   Inhalants never used         REPORTS SUBSTANCE USE: N/A   Heroin never used         REPORTS SUBSTANCE USE: N/A   Other Opiates never used     REPORTS SUBSTANCE USE: N/A   Benzodiazepine   never used     REPORTS SUBSTANCE USE: N/A   Barbiturates never used     REPORTS SUBSTANCE USE: N/A   Over the counter meds never used     REPORTS SUBSTANCE USE: N/A   Caffeine currently use 16   REPORTS SUBSTANCE USE: reports using substance 1 times per day and has 1 cup at a time.   Patient reports heaviest use is current use.   Nicotine  never used     REPORTS SUBSTANCE USE: N/A   Other substances not listed above:  Identify:  never used     REPORTS SUBSTANCE USE: N/A     Patient reported the following problems as a result of their substance use: no problems, not applicable.     CAGE- AID:    CAGE-AID Total Score 11/30/2021   Total Score 0   Total Score MyChart 0 (A total score of 2 or greater is considered clinically significant)       Substance Use: No symptoms    Based on the negative CAGE score and clinical interview there  are not indications of drug or alcohol abuse.      Significant Losses / Trauma / Abuse / Neglect Issues:   Patient did not  serve in the .  There are indications or report of significant loss, trauma, abuse or neglect issues related to: are no indications and client denies any losses, trauma, abuse, or neglect concerns.  Concerns for possible neglect are not present.     Safety Assessment:   Current Safety Concerns:  McLeod Suicide Severity Rating Scale (Lifetime/Recent)  McLeod Suicide Severity Rating (Lifetime/Recent) 11/30/2021   1. Wish to be Dead (Lifetime) Yes   Wish to be Dead Description (Lifetime) Patient reported that \" I had a stressfull family growing up and my parents  when I was 16 and I did not feel " "supported but I had just some then with no plan or intention to do it\"   1. Wish to be Dead (Recent) No   2. Non-Specific Active Suicidal Thoughts (Lifetime) Yes   2. Non-Specific Active Suicidal Thoughts (Recent) Yes   Non-Specific Active Suicidal Thought Description (Recent) (No Data)   Comments NA   3. Active Suicidal Ideation with any Methods (Not Plan) Without Intent to Act (Lifetime) No   3. Active Suicidal Ideation with any Methods (Not Plan) Without Intent to Act (Recent) No   Active Suicidal Ideation with any Methods (Not Plan) Description (Recent) (No Data)   Comments NA   4. Active Suicidal Ideation with Some Intent to Act, Without Specific Plan (Lifetime) No   4. Active Suicidal Ideation with Some Intent to Act, Without Specific Plan (Recent) No   Active Suicidal Ideation with Some Intent to Act, Without Specific Plan Description (Recent) No   5. Active Suicidal Ideation with Specific Plan and Intent (Lifetime) No   5. Active Suicidal Ideation with Specific Plan and Intent (Recent) No   Active Suicidal Ideation with Specific Plan and Intent Description (Recent) NA   Most Severe Ideation Rating (Lifetime) NA   Frequency (Lifetime) NA   Duration (Lifetime) 1   Controllability (Lifetime) 1   Protective Factors  (Lifetime) 0   Reasons for Ideation (Lifetime) 1   Most Severe Ideation Rating (Past Month) NA   Most Severe Ideation Description (Past Month) NA   Frequency (Past Month) NA   Duration (Past Month) NA   Controllability (Past Month) NA   Protective Factors (Past Month) NA   Reasons for Ideation (Past Month) NA   Actual Attempt (Lifetime) No   Actual Attempt (Past 3 Months) No   Has subject engaged in non-suicidal self-injurious behavior? (Lifetime) No   Has subject engaged in non-suicidal self-injurious behavior? (Past 3 Months) No   Interrupted Attempts (Past 3 Months) No   Aborted or Self-Interrupted Attempt (Past 3 Months) No   Preparatory Acts or Behavior (Past 3 Months) No   Most Recent Attempt " Actual Lethality Code NA   Most Lethal Attempt Actual Lethality Code NA   Initial/First Attempt Actual Lethality Code NA     Patient denies current homicidal ideation and behaviors.  Patient denies current self-injurious ideation and behaviors.    Patient denied risk behaviors associated with substance use.  Patient denies any high risk behaviors associated with mental health symptoms.  Patient reports the following current concerns for their personal safety: None.  Patient reports there are not firearms in the house.       .    History of Safety Concerns:  Patient denied a history of homicidal ideation.     Patient denied a history of personal safety concerns.    Patient denied a history of assaultive behaviors.    Patient denied a history of sexual assault behaviors.     Patient denied a history of risk behaviors associated with substance use.  Patient denies any history of high risk behaviors associated with mental health symptoms.  Patient reports the following protective factors: forward or future oriented thinking; effectively controls impulses; purpose; abstinence from substances; effective problem solving skills; healthy fear of risky behaviors or pain; strong sense of self worth or esteem; sense of personal control or determination; access to a variety of clinical interventions and pets    Risk Plan:  See Recommendations for Safety and Risk Management Plan    Review of Symptoms per patient report:  Depression: Change in energy level, Feelings of hopelessness, Feelings of helplessness and Withdrawn  Cara:  No Symptoms  Psychosis: No Symptoms  Anxiety: Excessive worry, Nervousness, Social anxiety and Ruminations  Panic:  No symptoms  Post Traumatic Stress Disorder:  No Symptoms   Eating Disorder: No Symptoms  ADD / ADHD:  No symptoms  Conduct Disorder: No symptoms  Autism Spectrum Disorder: No symptoms  Obsessive Compulsive Disorder: No Symptoms    Patient reports the following compulsive behaviors and  treatment history: None reported at this time.      Diagnostic Criteria:   Generalized Anxiety Disorder  A. Excessive anxiety and worry about a number of events or activities (such as work or school performance).   B. The person finds it difficult to control the worry.   - Restlessness or feeling keyed up or on edge.    - Being easily fatigued.    - Sleep disturbance (difficulty falling or staying asleep, or restless unsatisfying sleep).   D. The focus of the anxiety and worry is not confined to features of an Axis I disorder.  E. The anxiety, worry, or physical symptoms cause clinically significant distress or impairment in social, occupational, or other important areas of functioning.   F. The disturbance is not due to the direct physiological effects of a substance (e.g., a drug of abuse, a medication) or a general medical condition (e.g., hyperthyroidism) and does not occur exclusively during a Mood Disorder, a Psychotic Disorder, or a Pervasive Developmental Disorder.    - The aformentioned symptoms began 12 month(s) ago and occurs 5 days per week and is experienced as moderate.    Functional Status:  Patient reports the following functional impairments: social interactions and work / vocational responsibilities.     WHODAS:   WHODAS 2.0 Total Score 11/24/2021   Total Score 30   Total Score MyChart 30     Nonprogrammatic care:  Patient is requesting basic services to address current mental health concerns.    Clinical Summary:  1. Reason for assessment:Patient reported seeking help manage anxiety  .  2. Psychosocial, Cultural and Contextual Factors: Patient 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 Patient reported relocating to [place] for a contract job, reported that she is lonely, stress without community and a support system. Limited finances and the uncertainties surrounding future financial stability and relationship are anxiety inducing.  .  3.  Principal DSM5 Diagnoses  (Sustained by DSM5 Criteria Listed Above):   300.02 (F41.1) Generalized Anxiety Disorder.  4. Other Diagnoses that is relevant to services:   None at this time.  5. Provisional Diagnosis:  300.02 (F41.1) Generalized Anxiety Disorder as evidenced by   Being easily fatigued.  Having difficulty concentrating; mind going blank.  Being irritable.  Having muscle tension.  Difficulty controlling feelings of worry. .  6. Prognosis: Expect Improvement.  7. Likely consequences of symptoms if not treated:  Current condition could deteriorate causing significant adverse health outcome.  8. Client strengths include:  educated, goal-focused, good listener, insightful, intelligent, motivated, open to suggestions / feedback and willing to ask questions .     Recommendations:     1. Plan for Safety and Risk Management:   Recommended that patient call 911 or go to the local ED should there be a change in any of these risk factors..          Report to child / adult protection services was NA.     2. Patient's identified None at this time.     3. Initial Treatment will focus on:    Adjustment Difficulties related to: change of setting and living environment..     4. Resources/Service Plan:    services are not indicated.   Modifications to assist communication are not indicated.   Additional disability accommodations are not indicated.      5. Collaboration:   Collaboration / coordination of treatment will be initiated with the following  support professionals: NA.      6.  Referrals:   The following referral(s) will be initiated: None at this time. Next Scheduled Appointment: 12/27/21.     A Release of Information has been obtained for the following: NA.    7. SOFIA:    SOFIA:  Discussed the general effects of drugs and alcohol on health and well-being. Provider gave patient printed information about the effects of chemical use on their health and well being. Recommendations:  Patient reads information  and bring up any questions next session .     8. Records:   These were reviewed at time of assessment.   Information in this assessment was obtained from the medical record and  provided by patient who is a good historian.    Patient will have open access to their mental health medical record.      Provider Name/ Credentials:  RAGHU Che  December 9, 2021      ----- Service Performed and Documented by RAGHU------  This note was reviewed by TANNA Sutton [facility]

## 2021-12-27 ENCOUNTER — VIRTUAL VISIT (OUTPATIENT)
Dept: PSYCHOLOGY | Facility: CLINIC | Age: 31
End: 2021-12-27
Payer: COMMERCIAL

## 2021-12-27 DIAGNOSIS — F41.1 GENERALIZED ANXIETY DISORDER: Primary | ICD-10-CM

## 2021-12-27 PROCEDURE — 90834 PSYTX W PT 45 MINUTES: CPT | Mod: GT | Performed by: STUDENT IN AN ORGANIZED HEALTH CARE EDUCATION/TRAINING PROGRAM

## 2021-12-27 NOTE — PROGRESS NOTES
Progress Note    Patient Name: Anika Olvera  Date: 2021         Service Type: Individual      Session Start Time: 10.am  Session End Time: 10.49     Session Length: 49 mns    Session #: 03    Attendees: Client attended alone    Service Modality:  Video Visit:      Provider verified identity through the following two step process.  Patient provided:  Patient photo, Patient  and Patient address    Telemedicine Visit: The patient's condition can be safely assessed and treated via synchronous audio and visual telemedicine encounter.      Reason for Telemedicine Visit: Patient has requested telehealth visit    Originating Site (Patient Location): Patient's home    Distant Site (Provider Location): Provider Remote Setting- Home Office    Consent:  The patient/guardian has verbally consented to: the potential risks and benefits of telemedicine (video visit) versus in person care; bill my insurance or make self-payment for services provided; and responsibility for payment of non-covered services.     Patient would like the video invitation sent by:  My Chart    Mode of Communication:  Video Conference via Amwell    As the provider I attest to compliance with applicable laws and regulations related to telemedicine.     Treatment Plan Last Reviewed: 2021  PHQ-9 / JANUSZ-7 :     DATA  Interactive Complexity: No  Crisis: No       Progress Since Last Session (Related to Symptoms / Goals / Homework):   Symptoms: No change as patient reported that she is still feeling like being on the edge constantly.    Homework: Partially completed      Episode of Care Goals: Minimal progress - CONTEMPLATION (Considering change and yet undecided); Intervened by assessing the negative and positive thinking (ambivalence) about behavior change     Current / Ongoing Stressors and Concerns:   Patient reported spending the holiday alone and the feeling of spending the New Year alone again is  uncomfortable. Patient reported that she is constantly on the edge as she does not see lots of opportunities in her career and did not make it through a recent job interview.     Treatment Objective(s) Addressed in This Session:   identify three distraction and diversion activities and use those activities to decrease level of anxiety         Intervention:   CBT: behavioural activation and stress reduction techniques.        ASSESSMENT: Current Emotional / Mental Status (status of significant symptoms):   Risk status (Self / Other harm or suicidal ideation)   Patient denies current fears or concerns for personal safety.   Patient denies current or recent suicidal ideation or behaviors.   Patient denies current or recent homicidal ideation or behaviors.   Patient denies current or recent self injurious behavior or ideation.   Patient denies other safety concerns.   Patient reports there has been no change in risk factors since their last session.     Patient reports there has been no change in protective factors since their last session.     Recommended that patient call 911 or go to the local ED should there be a change in any of these risk factors.     Appearance:   Appropriate    Eye Contact:   Good    Psychomotor Behavior: Normal    Attitude:   Cooperative  Interested   Orientation:   All   Speech    Rate / Production: Normal/ Responsive    Volume:  Normal    Mood:    Normal   Affect:    Appropriate    Thought Content:  Clear    Thought Form:  Coherent  Logical    Insight:    Good      Medication Review:   No changes to current psychiatric medication(s)     Medication Compliance:   Yes     Changes in Health Issues:   None reported     Chemical Use Review:   Substance Use: Chemical use reviewed, no active concerns identified      Tobacco Use: No current tobacco use.      Diagnosis:  No diagnosis found.    Collateral Reports Completed:   Not Applicable    PLAN: (Patient Tasks / Therapist Tasks / Other)  Reflect on  secondary arrer options and share with therapist next session.        RAGHU Che                                        ----- Service Performed and Documented by RAGHU------  This note [facility] by [facility]    ______________________________________________________________________    Treatment Plan    Patient's Name: Anika Olvera  YOB: 1990    Date: 12/27/2021    DSM5 Diagnoses: 300.02 (F41.1) Generalized Anxiety Disorder  Psychosocial / Contextual Factors: Patient 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 Patient reported relocating to [place] for a contract job, reported that she is lonely, and stressed without community and a support system, limited finances and the uncertainties surrounding future financial stability and relationship are anxiety inducing.  WHODAS:     Referral / Collaboration:  NA.    Anticipated number of session or this episode of care: 15-20      MeasurableTreatment Goal(s) related to diagnosis / functional impairment(s)  Goal 1: Patient will improved coping with anxiety and anxiety symptoms.    I will know I've met my goal when I feel safe secured and stable not constantly being on the edge.       Objective #A (Patient Action)    Patient will identify specific triggers to feelings of anxiety.  Status: Completed - Date: 03/27/2022     Intervention(s)  Therapist will Assist client to identify issues of anxiety from the past, and other sources of anxiety vulnerability, and bring to resolution .    Objective #B  Patient will Develope alternative responses to substitute for past coping choices..  Status: 03/27/2022     Intervention(s)  Therapist will provide patients with a skills to manage anxiety so patient can explore and utilise what works for her.    Objective #C  Patient will learn and practice relaxation techniques to reduce anxiety..  Status: 03/27/2022     Intervention(s)  Therapist will teach  patient thought stopping, deep breathing, mindfull meditation an creative visualization techniques to assist patient mitigate anxiuos feelings..      Patient has reviewed and agreed to the above plan.      RAGHU Che  December 27, 2021

## 2022-01-13 ENCOUNTER — VIRTUAL VISIT (OUTPATIENT)
Dept: PSYCHOLOGY | Facility: CLINIC | Age: 32
End: 2022-01-13
Payer: COMMERCIAL

## 2022-01-13 DIAGNOSIS — F41.1 GENERALIZED ANXIETY DISORDER: Primary | ICD-10-CM

## 2022-01-13 PROCEDURE — 90834 PSYTX W PT 45 MINUTES: CPT | Mod: GT | Performed by: STUDENT IN AN ORGANIZED HEALTH CARE EDUCATION/TRAINING PROGRAM

## 2022-01-13 ASSESSMENT — ANXIETY QUESTIONNAIRES
2. NOT BEING ABLE TO STOP OR CONTROL WORRYING: SEVERAL DAYS
6. BECOMING EASILY ANNOYED OR IRRITABLE: MORE THAN HALF THE DAYS
7. FEELING AFRAID AS IF SOMETHING AWFUL MIGHT HAPPEN: NEARLY EVERY DAY
GAD7 TOTAL SCORE: 12
7. FEELING AFRAID AS IF SOMETHING AWFUL MIGHT HAPPEN: NEARLY EVERY DAY
GAD7 TOTAL SCORE: 12
5. BEING SO RESTLESS THAT IT IS HARD TO SIT STILL: SEVERAL DAYS
3. WORRYING TOO MUCH ABOUT DIFFERENT THINGS: SEVERAL DAYS
GAD7 TOTAL SCORE: 12
1. FEELING NERVOUS, ANXIOUS, OR ON EDGE: NEARLY EVERY DAY
4. TROUBLE RELAXING: SEVERAL DAYS

## 2022-01-13 ASSESSMENT — PATIENT HEALTH QUESTIONNAIRE - PHQ9
SUM OF ALL RESPONSES TO PHQ QUESTIONS 1-9: 10
10. IF YOU CHECKED OFF ANY PROBLEMS, HOW DIFFICULT HAVE THESE PROBLEMS MADE IT FOR YOU TO DO YOUR WORK, TAKE CARE OF THINGS AT HOME, OR GET ALONG WITH OTHER PEOPLE: SOMEWHAT DIFFICULT
SUM OF ALL RESPONSES TO PHQ QUESTIONS 1-9: 10
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
## 2022-01-14 ASSESSMENT — ANXIETY QUESTIONNAIRES: GAD7 TOTAL SCORE: 12

## 2022-01-14 ASSESSMENT — PATIENT HEALTH QUESTIONNAIRE - PHQ9: SUM OF ALL RESPONSES TO PHQ QUESTIONS 1-9: 10

## 2022-01-20 ENCOUNTER — IMMUNIZATION (OUTPATIENT)
Dept: NURSING | Facility: CLINIC | Age: 32
End: 2022-01-20
Payer: COMMERCIAL

## 2022-01-20 PROCEDURE — 91305 COVID-19,PF,PFIZER (12+ YRS): CPT

## 2022-01-20 PROCEDURE — 0054A COVID-19,PF,PFIZER (12+ YRS): CPT

## 2022-01-27 ENCOUNTER — VIRTUAL VISIT (OUTPATIENT)
Dept: PSYCHOLOGY | Facility: CLINIC | Age: 32
End: 2022-01-27
Payer: COMMERCIAL

## 2022-01-27 DIAGNOSIS — F33.0 MAJOR DEPRESSIVE DISORDER, RECURRENT EPISODE, MILD WITH ANXIOUS DISTRESS (H): ICD-10-CM

## 2022-01-27 DIAGNOSIS — F41.1 GENERALIZED ANXIETY DISORDER: Primary | ICD-10-CM

## 2022-01-27 PROCEDURE — 90834 PSYTX W PT 45 MINUTES: CPT | Mod: GT | Performed by: STUDENT IN AN ORGANIZED HEALTH CARE EDUCATION/TRAINING PROGRAM

## 2022-01-27 ASSESSMENT — ANXIETY QUESTIONNAIRES
GAD7 TOTAL SCORE: 14
2. NOT BEING ABLE TO STOP OR CONTROL WORRYING: NEARLY EVERY DAY
7. FEELING AFRAID AS IF SOMETHING AWFUL MIGHT HAPPEN: MORE THAN HALF THE DAYS
3. WORRYING TOO MUCH ABOUT DIFFERENT THINGS: MORE THAN HALF THE DAYS
4. TROUBLE RELAXING: MORE THAN HALF THE DAYS
GAD7 TOTAL SCORE: 14
1. FEELING NERVOUS, ANXIOUS, OR ON EDGE: NEARLY EVERY DAY
6. BECOMING EASILY ANNOYED OR IRRITABLE: SEVERAL DAYS
5. BEING SO RESTLESS THAT IT IS HARD TO SIT STILL: SEVERAL DAYS
7. FEELING AFRAID AS IF SOMETHING AWFUL MIGHT HAPPEN: MORE THAN HALF THE DAYS
GAD7 TOTAL SCORE: 14

## 2022-01-27 ASSESSMENT — PATIENT HEALTH QUESTIONNAIRE - PHQ9
SUM OF ALL RESPONSES TO PHQ QUESTIONS 1-9: 11
SUM OF ALL RESPONSES TO PHQ QUESTIONS 1-9: 11
10. IF YOU CHECKED OFF ANY PROBLEMS, HOW DIFFICULT HAVE THESE PROBLEMS MADE IT FOR YOU TO DO YOUR WORK, TAKE CARE OF THINGS AT HOME, OR GET ALONG WITH OTHER PEOPLE: VERY DIFFICULT

## 2022-01-27 NOTE — PROGRESS NOTES
Progress Note    Patient Name: Anika Olvera  Date: 01/27/2022         Service Type: Individual      Session Start Time: 09  Session End Time: 9.49     Session Length: 49 mns    Session #:05    Attendees: Client attended alone    Service Modality:  Video Visit:      Provider verified identity through the following two step process.  Patient provided:  Patient is known previously to provider    Telemedicine Visit: The patient's condition can be safely assessed and treated via synchronous audio and visual telemedicine encounter.      Reason for Telemedicine Visit: Patient has requested telehealth visit    Originating Site (Patient Location): Patient's home    Distant Site (Provider Location): Provider Remote Setting- Home Office    Consent:  The patient/guardian has verbally consented to: the potential risks and benefits of telemedicine (video visit) versus in person care; bill my insurance or make self-payment for services provided; and responsibility for payment of non-covered services.     Patient would like the video invitation sent by:  My Chart    Mode of Communication:  Video Conference via Amwell    As the provider I attest to compliance with applicable laws and regulations related to telemedicine.     Treatment Plan Last Reviewed: 12/27/2021  PHQ-9 / JANUSZ-7 :   PHQ 11/30/2021 1/13/2022 1/27/2022   PHQ-9 Total Score 9 10 11   Q9: Thoughts of better off dead/self-harm past 2 weeks Not at all Not at all Not at all     JANUSZ-7 SCORE 11/30/2021 1/13/2022 1/27/2022   Total Score 16 (severe anxiety) 12 (moderate anxiety) 14 (moderate anxiety)   Total Score 16 12 14       DATA  Interactive Complexity: No  Crisis: No       Progress Since Last Session (Related to Symptoms / Goals / Homework):   Symptoms: No change as patient is still isolated and anxious as evident by current JAUNSZ 7 score    Homework: Achieved / completed to satisfaction      Episode of Care Goals: No  improvement - PREPARATION (Decided to change - considering how); Intervened by negotiating a change plan and determining options / strategies for behavior change, identifying triggers, exploring social supports, and working towards setting a date to begin behavior change     Current / Ongoing Stressors and Concerns:   Patient reported spending most of her time alone due to lack of community and no friend/ family in [place]. Patient reported that she is searching for a new job but unsure about the prospects of getting one soon. The instability associated with working part time on a [employer] funded project have been anxiety inducing.     Treatment Objective(s) Addressed in This Session:   attend and participate in social or recreational activities twice a week       Intervention:   CBT: Behavioral activation and stress reduction skills.        ASSESSMENT: Current Emotional / Mental Status (status of significant symptoms):   Risk status (Self / Other harm or suicidal ideation)   Patient denies current fears or concerns for personal safety.   Patient denies current or recent suicidal ideation or behaviors.   Patient denies current or recent homicidal ideation or behaviors.   Patient denies current or recent self injurious behavior or ideation.   Patient denies other safety concerns.   Patient reports there has been no change in risk factors since their last session.     Patient reports there has been no change in protective factors since their last session.     Recommended that patient call 911 or go to the local ED should there be a change in any of these risk factors.     Appearance:   Appropriate    Eye Contact:   Good    Psychomotor Behavior: Normal    Attitude:   Cooperative  Interested   Orientation:   All   Speech    Rate / Production: Normal/ Responsive    Volume:  Normal    Mood:    Normal   Affect:    Appropriate    Thought Content:  Clear    Thought Form:  Coherent  Logical    Insight:    Good      Medication  Review:   No changes to current psychiatric medication(s)     Medication Compliance:   Yes     Changes in Health Issues:   None reported     Chemical Use Review:   Substance Use: Chemical use reviewed, no active concerns identified      Tobacco Use: No current tobacco use.      Diagnosis:  300.02 (F41.1) Generalized Anxiety Disorder  Collateral Reports Completed:   Not Applicable    PLAN: (Patient Tasks / Therapist Tasks / Other)  Complete handout on strength exploration with focus on professional and relationship and process with therapist next session.          RAGHU Che  January 27, 2022                                                ----- Service Performed and Documented by RAGHU------  This note was reviewed and clinical supervision by TANNA Sutton [facility]     2/1/2022   ______________________________________________________________________    Treatment Plan    Patient's Name: Anika Olvera  YOB: 1990    Date: 12/27/2021    DSM5 Diagnoses: 300.02 (F41.1) Generalized Anxiety Disorder  Psychosocial / Contextual Factors: Patient 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 Patient reported relocating to [place] for a contract job, reported that she is lonely, and stressed without community and a support system, limited finances and the uncertainties surrounding future financial stability and relationship are anxiety inducing.  WHODAS:     Referral / Collaboration:  NA.    Anticipated number of session or this episode of care: 15-20       MeasurableTreatment Goal(s) related to diagnosis / functional impairment(s)  Goal 1: Patient will improved coping with anxiety and anxiety symptoms.    I will know I've met my goal when I feel safe secured and stable not constantly being on the edge.       Objective #A (Patient Action)    Patient will identify specific triggers to feelings of anxiety.  Status: Completed - Date: 03/27/2022      Intervention(s)  Therapist will Assist client to identify issues of anxiety from the past, and other sources of anxiety vulnerability, and bring to resolution .    Objective #B  Patient will Develope alternative responses to substitute for past coping choices..  Status: 03/27/2022     Intervention(s)  Therapist will provide patients with a skills to manage anxiety so patient can explore and utilise what works for her.    Objective #C  Patient will learn and practice relaxation techniques to reduce anxiety..  Status: 03/27/2022     Intervention(s)  Therapist will teach patient thought stopping, deep breathing, mindfull meditation an creative visualization techniques to assist patient mitigate anxiuos feelings..      Patient has reviewed and agreed to the above plan.      RAGHU Che  December 27, 2021  Answers for HPI/ROS submitted by the patient on 1/13/2022  If you checked off any problems, how difficult have these problems made it for you to do your work, take care of things at home, or get along with other people?: Somewhat difficult  PHQ9 TOTAL SCORE: 10  JANUSZ 7 TOTAL SCORE: 12        Answers for HPI/ROS submitted by the patient on 1/27/2022  If you checked off any problems, how difficult have these problems made it for you to do your work, take care of things at home, or get along with other people?: Very difficult  PHQ9 TOTAL SCORE: 11  JANUSZ 7 TOTAL SCORE: 14

## 2022-01-28 ASSESSMENT — ANXIETY QUESTIONNAIRES: GAD7 TOTAL SCORE: 14

## 2022-01-28 ASSESSMENT — PATIENT HEALTH QUESTIONNAIRE - PHQ9: SUM OF ALL RESPONSES TO PHQ QUESTIONS 1-9: 11

## 2022-02-03 ENCOUNTER — VIRTUAL VISIT (OUTPATIENT)
Dept: PSYCHOLOGY | Facility: CLINIC | Age: 32
End: 2022-02-03
Payer: COMMERCIAL

## 2022-02-03 DIAGNOSIS — F41.1 GENERALIZED ANXIETY DISORDER: Primary | ICD-10-CM

## 2022-02-03 PROCEDURE — 90834 PSYTX W PT 45 MINUTES: CPT | Mod: GT | Performed by: STUDENT IN AN ORGANIZED HEALTH CARE EDUCATION/TRAINING PROGRAM

## 2022-02-03 NOTE — PROGRESS NOTES
Progress Note    Patient Name: Anika Olvera  Date: 02/03/2022         Service Type: Individual      Session Start Time: 9.00  Session End Time: 9.50     Session Length: 50 mns    Session #: 06    Attendees: Client attended alone    Service Modality:  Video Visit:      Provider verified identity through the following two step process.  Patient provided:  Patient is known previously to provider    Telemedicine Visit: The patient's condition can be safely assessed and treated via synchronous audio and visual telemedicine encounter.      Reason for Telemedicine Visit: Patient has requested telehealth visit    Originating Site (Patient Location): Patient's home    Distant Site (Provider Location): Provider Remote Setting- Home Office    Consent:  The patient/guardian has verbally consented to: the potential risks and benefits of telemedicine (video visit) versus in person care; bill my insurance or make self-payment for services provided; and responsibility for payment of non-covered services.     Patient would like the video invitation sent by:  My Chart    Mode of Communication:  Video Conference via Amwell    As the provider I attest to compliance with applicable laws and regulations related to telemedicine.     Treatment Plan Last Reviewed: 12/27/2021  PHQ-9 / JANUSZ-7 :   PHQ 11/30/2021 1/13/2022 1/27/2022   PHQ-9 Total Score 9 10 11   Q9: Thoughts of better off dead/self-harm past 2 weeks Not at all Not at all Not at all     JANUSZ-7 SCORE 11/30/2021 1/13/2022 1/27/2022   Total Score 16 (severe anxiety) 12 (moderate anxiety) 14 (moderate anxiety)   Total Score 16 12 14         DATA  Interactive Complexity: No  Crisis: No       Progress Since Last Session (Related to Symptoms / Goals / Homework):   Symptoms: Improving as patient completed homework and reported more awareness in anxiety inducing thoughts    Homework: Achieved / completed to satisfaction      Episode of Care  "Goals: Minimal progress - ACTION (Actively working towards change); Intervened by reinforcing change plan / affirming steps taken     Current / Ongoing Stressors and Concerns:   Ambivelence about future career as a . Patient reported that she is looking forward to the second phase of a job interview for a tenure track  position but still have some lingering doubts about what exactly she wants to do. Patient reported feeling anxious about being in this mental cross roads  on daily basis.     Treatment Objective(s) Addressed in This Session:   use thought-stopping strategy daily to reduce intrusive thoughts       Intervention:   Discussed response/cognitve styles in session.  Identified thought-feeling-action connection and introduced concept of \"vicious cycle.\"  Discussed cognitive  distortions and led client in an exercise geared to recognizing same.  Explored alternative explanations and assisted client in setting an initial therapy goal to use  alternative explanations to challenge cognitive distortions.  Led client in a progressive muscle relaxation exercise and a deep breathing exercise in session.   Encouraged client to practice daily to promote relaxation and anxiety reduction.  Client was active and engaged in all aspects of the session.  Client identified  ambivalence about future career as one of the issues of most concern.  Encouraged client to use skills learned in this session as needed to address  catastrophizing.        ASSESSMENT: Current Emotional / Mental Status (status of significant symptoms):   Risk status (Self / Other harm or suicidal ideation)   Patient denies current fears or concerns for personal safety.   Patient denies current or recent suicidal ideation or behaviors.   Patient denies current or recent homicidal ideation or behaviors.   Patient denies current or recent self injurious behavior or ideation.   Patient denies other safety concerns.   Patient reports there " has been no change in risk factors since their last session.     Patient reports there has been no change in protective factors since their last session.     Recommended that patient call 911 or go to the local ED should there be a change in any of these risk factors.     Appearance:   Appropriate    Eye Contact:   Good    Psychomotor Behavior: Normal    Attitude:   Cooperative  Interested   Orientation:   All   Speech    Rate / Production: Normal/ Responsive    Volume:  Normal    Mood:    Normal   Affect:    Appropriate    Thought Content:  Clear    Thought Form:  Coherent  Logical    Insight:    Good      Medication Review:   No changes to current psychiatric medication(s)     Medication Compliance:   Yes     Changes in Health Issues:   None reported     Chemical Use Review:   Substance Use: Chemical use reviewed, no active concerns identified      Tobacco Use: No current tobacco use.      Diagnosis:  300.02 (F41.1) Generalized Anxiety Disorder    Collateral Reports Completed:   Not Applicable    PLAN: (Patient Tasks / Therapist Tasks / Other)   Complete provided  thought diary work sheet and process with therapist next session.        Ray Patrick BLESSING  February 3, 2022    ----- Service Performed and Documented by [facility]------  This note was reviewed and clinical supervision by TANNA Sutton [facility]     2/4/2022   ______________________________________________________________________    Treatment Plan    Patient's Name: Anika Olvera  YOB: 1990    Date: 12/27/2021    DSM5 Diagnoses: 300.02 (F41.1) Generalized Anxiety Disorder  Psychosocial / Contextual Factors: Patient 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 Patient reported relocating to [place] for a contract job, reported that she is lonely, and stressed without community and a support system, limited finances and the uncertainties surrounding future financial stability and relationship  are anxiety inducing.  WHODAS:     Referral / Collaboration:  NA.    Anticipated number of session or this episode of care: 15-20       MeasurableTreatment Goal(s) related to diagnosis / functional impairment(s)  Goal 1: Patient will improved coping with anxiety and anxiety symptoms.    I will know I've met my goal when I feel safe secured and stable not constantly being on the edge.       Objective #A (Patient Action)    Patient will identify specific triggers to feelings of anxiety.  Status: Completed - Date: 03/27/2022     Intervention(s)  Therapist will Assist client to identify issues of anxiety from the past, and other sources of anxiety vulnerability, and bring to resolution .    Objective #B  Patient will Develope alternative responses to substitute for past coping choices..  Status: 03/27/2022     Intervention(s)  Therapist will provide patients with a skills to manage anxiety so patient can explore and utilise what works for her.    Objective #C  Patient will learn and practice relaxation techniques to reduce anxiety..  Status: 03/27/2022     Intervention(s)  Therapist will teach patient thought stopping, deep breathing, mindfull meditation an creative visualization techniques to assist patient mitigate anxiuos feelings..      Patient has reviewed and agreed to the above plan.      RAGHU Che  December 27, 2021  Answers for HPI/ROS submitted by the patient on 1/13/2022  If you checked off any problems, how difficult have these problems made it for you to do your work, take care of things at home, or get along with other people?: Somewhat difficult  PHQ9 TOTAL SCORE: 10  JANUSZ 7 TOTAL SCORE: 12        Answers for HPI/ROS submitted by the patient on 1/27/2022  If you checked off any problems, how difficult have these problems made it for you to do your work, take care of things at home, or get along with other people?: Very difficult  PHQ9 TOTAL SCORE: 11  JANUSZ 7 TOTAL SCORE: 14

## 2022-02-24 ENCOUNTER — VIRTUAL VISIT (OUTPATIENT)
Dept: PSYCHOLOGY | Facility: CLINIC | Age: 32
End: 2022-02-24
Payer: COMMERCIAL

## 2022-02-24 DIAGNOSIS — F41.1 GENERALIZED ANXIETY DISORDER: Primary | ICD-10-CM

## 2022-02-24 PROCEDURE — 90834 PSYTX W PT 45 MINUTES: CPT | Mod: GT | Performed by: STUDENT IN AN ORGANIZED HEALTH CARE EDUCATION/TRAINING PROGRAM

## 2022-02-24 NOTE — PROGRESS NOTES
[facility]                                     Progress Note    Patient Name: Anika Olvera  Date: 02/24/2022         Service Type: Individual      Session Start Time: 9.00  Session End Time: 9.50     Session Length: 50 mns    Session #: 07    Attendees: Client attended alone    Service Modality:  Video Visit:      Provider verified identity through the following two step process.  Patient provided:  Patient is known previously to provider    Telemedicine Visit: The patient's condition can be safely assessed and treated via synchronous audio and visual telemedicine encounter.      Reason for Telemedicine Visit: Patient has requested telehealth visit    Originating Site (Patient Location): Patient's home    Distant Site (Provider Location): Provider Remote Setting- Home Office    Consent:  The patient/guardian has verbally consented to: the potential risks and benefits of telemedicine (video visit) versus in person care; bill my insurance or make self-payment for services provided; and responsibility for payment of non-covered services.     Patient would like the video invitation sent by:  My Chart    Mode of Communication:  Video Conference via Amwell    As the provider I attest to compliance with applicable laws and regulations related to telemedicine.    DATA  Interactive Complexity: No  Crisis: No        Progress Since Last Session (Related to Symptoms / Goals / Homework):   Symptoms: Improving as patient reported having a new friend and having a walk together in a park with someone for the first time since moving to [place]    Homework: Achieved / completed to satisfaction discuss the ABC of CBT using completed thought diary worksheet.       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 Pt reported her interview is  dependent on the outcome of the interview and she does not have another sustainable plan if she is not offered the job. Pt reported thoughts  about going  to live with her boyfriend if she is not offered the position as the only option is anxiety inducing. Pt reported that she will like to continue therapy when she  moves out of state but felt bad that the system doesn't allow for clinical practice across state lines.     Treatment Objective(s) Addressed in This Session:   use cognitive strategies identified in therapy to challenge anxious thoughts       Intervention:   Reviewed thought-feeling-action connection and discussed ABC model, where A=activating/adverse event, B=belief about self, and C=consequence.  Provided example of the ABC model relative to client's presenting concerns.  Reviewed cognitive distortions and helpful versus unhelpful responses.  Discussed techniques to challenge the belief about the event by disputing the validity of the belief. Work with pt in session utilizing her completed \"thought diary \" worksheet to illustrate unhelpful thinking styles. Encourage pt to utilize same method in identifying cognitive distortions.    Assessments completed prior to visit: None    ASSESSMENT: Current Emotional / Mental Status (status of significant symptoms):    Risk status (Self / Other harm or suicidal ideation)   Patient denies current fears or concerns for personal safety.   Patient denies current or recent suicidal ideation or behaviors.   Patient denies current or recent homicidal ideation or behaviors.   Patient denies current or recent self injurious behavior or ideation.   Patient denies other safety concerns.   Patient reports there has been no change in risk factors since their last session.     Patient reports there has been no change in protective factors since their last session.  "    Recommended that patient call 911 or go to the local ED should there be a change in any of these risk factors.     Appearance:   Appropriate    Eye Contact:   Good    Psychomotor Behavior: Normal    Attitude:   Cooperative  Interested Friendly Pleasant   Orientation:   All   Speech    Rate / Production: Normal/ Responsive    Volume:  Normal    Mood:    Normal   Affect:    Appropriate    Thought Content:  Clear    Thought Form:  Coherent  Logical    Insight:    Good      Medication Review:   No changes to current psychiatric medication(s)     Medication Compliance:   Yes     Changes in Health Issues:   None reported     Chemical Use Review:   Substance Use: Chemical use reviewed, no active concerns identified      Tobacco Use: No current tobacco use.      Diagnosis:  300.02 (F41.1) Generalized Anxiety Disorder  Collateral Reports Completed:   Not Applicable    PLAN: (Patient Tasks / Therapist Tasks / Other)  Complete provided  goal setting worksheet identifying what has been achieved in previous sessions and arrears of improvement to discuss at last session next week        RAGHU Che  February 24, 2022     ----- Service Performed and Documented by RAGHU------  This note was reviewed and clinical supervision by TANNA Sutton [facility]     2/28/2022     ______________________________________________________________________    Treatment Plan    Patient's Name: Anika Olvera  YOB: 1990    Date: 12/27/2021    DSM5 Diagnoses: 300.02 (F41.1) Generalized Anxiety Disorder  Psychosocial / Contextual Factors: Patient 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 Patient reported relocating to [place] for a contract job, reported that she is lonely, and stressed without community and a support system, limited finances and the uncertainties surrounding future financial stability and relationship are anxiety inducing.  WHODAS:     Referral /  Collaboration:  NA.    Anticipated number of session or this episode of care: 15-20       MeasurableTreatment Goal(s) related to diagnosis / functional impairment(s)  Goal 1: Patient will improved coping with anxiety and anxiety symptoms.    I will know I've met my goal when I feel safe secured and stable not constantly being on the edge.       Objective #A (Patient Action)    Patient will identify specific triggers to feelings of anxiety.  Status: Completed - Date: 03/27/2022     Intervention(s)  Therapist will Assist client to identify issues of anxiety from the past, and other sources of anxiety vulnerability, and bring to resolution .    Objective #B  Patient will Develope alternative responses to substitute for past coping choices..  Status: 03/27/2022     Intervention(s)  Therapist will provide patients with a skills to manage anxiety so patient can explore and utilise what works for her.    Objective #C  Patient will learn and practice relaxation techniques to reduce anxiety..  Status: 03/27/2022     Intervention(s)  Therapist will teach patient thought stopping, deep breathing, mindfull meditation an creative visualization techniques to assist patient mitigate anxiuos feelings..      Patient has reviewed and agreed to the above plan.      RAGHU Che  December 27, 2021      Answers for HPI/ROS submitted by the patient on 1/27/2022  If you checked off any problems, how difficult have these problems made it for you to do your work, take care of things at home, or get along with other people?: Very difficult  PHQ9 TOTAL SCORE: 11  JANUSZ 7 TOTAL SCORE: 14

## 2022-03-01 ENCOUNTER — VIRTUAL VISIT (OUTPATIENT)
Dept: PSYCHOLOGY | Facility: CLINIC | Age: 32
End: 2022-03-01
Payer: COMMERCIAL

## 2022-03-01 DIAGNOSIS — F41.1 GENERALIZED ANXIETY DISORDER: Primary | ICD-10-CM

## 2022-03-01 PROCEDURE — 90834 PSYTX W PT 45 MINUTES: CPT | Mod: GT | Performed by: STUDENT IN AN ORGANIZED HEALTH CARE EDUCATION/TRAINING PROGRAM

## 2022-03-01 NOTE — PROGRESS NOTES
Discharge Summary  Single Session    Client Name: Anika Olvera MRN#: [id number] YOB: 1990    Discharge Date:   March 1, 2022    Service Modality: Video Visit:      Provider verified identity through the following two step process.  Patient provided:  Patient is known previously to provider    Telemedicine Visit: The patient's condition can be safely assessed and treated via synchronous audio and visual telemedicine encounter.      Reason for Telemedicine Visit: Patient has requested telehealth visit    Originating Site (Patient Location): Patient's home    Distant Site (Provider Location): [facility]    Consent:  The patient/guardian has verbally consented to: the potential risks and benefits of telemedicine (video visit) versus in person care; bill my insurance or make self-payment for services provided; and responsibility for payment of non-covered services.     Patient would like the video invitation sent by:  My Chart    Mode of Communication:  Video Conference via Amwell    As the provider I attest to compliance with applicable laws and regulations related to telemedicine.    Service Type: Individual      Session Start Time: 08  Session End Time: 8.46      Session Length: 45 - 50     Session #:08     Attendees: Client attended alone      Focus of Treatment Objective(s):  Client's presenting concerns included: Anxiety - Associated with uncertainties about job, career and finances  Stage of Change at time of Discharge: ACTION (Actively working towards change)    Medication Adherence:  Yes    Chemical Use:  NA    Assessment: Current Emotional / Mental Status (status of significant symptoms):    Risk status (Self / Other harm or suicidal ideation)  Client denies current fears or concerns for personal safety.  Client denies current or recent suicidal ideation or behaviors.  Client denies current or recent homicidal  ideation or behaviors.  Client denies current or recent self injurious behavior or ideation.  Client denies other safety concerns.  A safety and risk management plan has not been developed at this time, however client was given the after-hours number should there be a change in any of these risk factors.    Appearance:   Appropriate   Eye Contact:   Good   Psychomotor Behavior: Normal   Attitude:   Cooperative  Interested Playful Friendly Pleasant  Orientation:   All  Speech   Rate / Production: Normal/ Responsive   Volume:  Normal   Mood:    Normal  Affect:    Appropriate   Thought Content:  Clear   Thought Form:  Coherent  Logical   Insight:   Good     DSM5 Diagnoses: (Sustained by DSM5 Criteria Listed Above)  Diagnoses: 300.02 (F41.1) Generalized Anxiety Disorder  Psychosocial & Contextual Factors: Patient 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 Patient reported relocating to [place] for a contract job, reported that she is lonely, and stressed without community and a support system, limited finances and the uncertainties surrounding future financial stability and relationship are anxiety inducing.  WHODAS 2.0 (12 item) Score:     Reason for Discharge:  Client is moving to [facility] by the end of the week and therapy cannot be continued with client being out of state.      Aftercare Plan:  Client may resume counseling services at any time in the future by calling the [facility] Intake Office, [phone].      RAGHU Che  March 1, 2022    ----- Service Performed and Documented by RAGHU------  This note was reviewed and clinical supervision by TANNA Sutton     3/4/2022

## 2022-07-17 ENCOUNTER — HEALTH MAINTENANCE LETTER (OUTPATIENT)
Age: 32
End: 2022-07-17

## 2022-09-24 ENCOUNTER — HEALTH MAINTENANCE LETTER (OUTPATIENT)
Age: 32
End: 2022-09-24

## 2023-08-05 ENCOUNTER — HEALTH MAINTENANCE LETTER (OUTPATIENT)
Age: 33
End: 2023-08-05

## 2024-09-22 ENCOUNTER — HEALTH MAINTENANCE LETTER (OUTPATIENT)
Age: 34
End: 2024-09-22